# Patient Record
Sex: FEMALE | Race: BLACK OR AFRICAN AMERICAN | NOT HISPANIC OR LATINO | URBAN - METROPOLITAN AREA
[De-identification: names, ages, dates, MRNs, and addresses within clinical notes are randomized per-mention and may not be internally consistent; named-entity substitution may affect disease eponyms.]

---

## 2020-01-07 ENCOUNTER — EMERGENCY (EMERGENCY)
Facility: HOSPITAL | Age: 25
LOS: 0 days | Discharge: HOME | End: 2020-01-07
Attending: EMERGENCY MEDICINE
Payer: COMMERCIAL

## 2020-01-07 VITALS
DIASTOLIC BLOOD PRESSURE: 75 MMHG | SYSTOLIC BLOOD PRESSURE: 112 MMHG | OXYGEN SATURATION: 99 % | RESPIRATION RATE: 16 BRPM | TEMPERATURE: 98 F | HEART RATE: 72 BPM

## 2020-01-07 VITALS
WEIGHT: 110.01 LBS | SYSTOLIC BLOOD PRESSURE: 133 MMHG | DIASTOLIC BLOOD PRESSURE: 84 MMHG | HEIGHT: 64 IN | TEMPERATURE: 98 F | HEART RATE: 97 BPM | RESPIRATION RATE: 20 BRPM | OXYGEN SATURATION: 98 %

## 2020-01-07 DIAGNOSIS — R00.2 PALPITATIONS: ICD-10-CM

## 2020-01-07 DIAGNOSIS — N93.9 ABNORMAL UTERINE AND VAGINAL BLEEDING, UNSPECIFIED: ICD-10-CM

## 2020-01-07 DIAGNOSIS — Z88.1 ALLERGY STATUS TO OTHER ANTIBIOTIC AGENTS STATUS: ICD-10-CM

## 2020-01-07 DIAGNOSIS — Z88.0 ALLERGY STATUS TO PENICILLIN: ICD-10-CM

## 2020-01-07 LAB
ALBUMIN SERPL ELPH-MCNC: 3.4 G/DL — SIGNIFICANT CHANGE UP (ref 3.3–5)
ALP SERPL-CCNC: 46 U/L — SIGNIFICANT CHANGE UP (ref 40–120)
ALT FLD-CCNC: 20 U/L — SIGNIFICANT CHANGE UP (ref 12–78)
ANION GAP SERPL CALC-SCNC: 6 MMOL/L — SIGNIFICANT CHANGE UP (ref 5–17)
APTT BLD: 28.2 SEC — SIGNIFICANT CHANGE UP (ref 27.5–36.3)
AST SERPL-CCNC: 20 U/L — SIGNIFICANT CHANGE UP (ref 15–37)
BASOPHILS # BLD AUTO: 0.04 K/UL — SIGNIFICANT CHANGE UP (ref 0–0.2)
BASOPHILS NFR BLD AUTO: 0.7 % — SIGNIFICANT CHANGE UP (ref 0–2)
BILIRUB SERPL-MCNC: 0.1 MG/DL — LOW (ref 0.2–1.2)
BUN SERPL-MCNC: 8 MG/DL — SIGNIFICANT CHANGE UP (ref 7–23)
CALCIUM SERPL-MCNC: 8.8 MG/DL — SIGNIFICANT CHANGE UP (ref 8.5–10.1)
CHLORIDE SERPL-SCNC: 108 MMOL/L — SIGNIFICANT CHANGE UP (ref 96–108)
CO2 SERPL-SCNC: 27 MMOL/L — SIGNIFICANT CHANGE UP (ref 22–31)
CREAT SERPL-MCNC: 0.74 MG/DL — SIGNIFICANT CHANGE UP (ref 0.5–1.3)
EOSINOPHIL # BLD AUTO: 0.14 K/UL — SIGNIFICANT CHANGE UP (ref 0–0.5)
EOSINOPHIL NFR BLD AUTO: 2.6 % — SIGNIFICANT CHANGE UP (ref 0–6)
GLUCOSE SERPL-MCNC: 90 MG/DL — SIGNIFICANT CHANGE UP (ref 70–99)
HCG SERPL-ACNC: <1 MIU/ML — SIGNIFICANT CHANGE UP
HCT VFR BLD CALC: 37.9 % — SIGNIFICANT CHANGE UP (ref 34.5–45)
HGB BLD-MCNC: 11.5 G/DL — SIGNIFICANT CHANGE UP (ref 11.5–15.5)
IMM GRANULOCYTES NFR BLD AUTO: 0 % — SIGNIFICANT CHANGE UP (ref 0–1.5)
INR BLD: 1.16 RATIO — SIGNIFICANT CHANGE UP (ref 0.88–1.16)
LYMPHOCYTES # BLD AUTO: 2.2 K/UL — SIGNIFICANT CHANGE UP (ref 1–3.3)
LYMPHOCYTES # BLD AUTO: 41.1 % — SIGNIFICANT CHANGE UP (ref 13–44)
MCHC RBC-ENTMCNC: 25 PG — LOW (ref 27–34)
MCHC RBC-ENTMCNC: 30.3 GM/DL — LOW (ref 32–36)
MCV RBC AUTO: 82.4 FL — SIGNIFICANT CHANGE UP (ref 80–100)
MONOCYTES # BLD AUTO: 0.6 K/UL — SIGNIFICANT CHANGE UP (ref 0–0.9)
MONOCYTES NFR BLD AUTO: 11.2 % — SIGNIFICANT CHANGE UP (ref 2–14)
NEUTROPHILS # BLD AUTO: 2.37 K/UL — SIGNIFICANT CHANGE UP (ref 1.8–7.4)
NEUTROPHILS NFR BLD AUTO: 44.4 % — SIGNIFICANT CHANGE UP (ref 43–77)
NRBC # BLD: 0 /100 WBCS — SIGNIFICANT CHANGE UP (ref 0–0)
PLATELET # BLD AUTO: 269 K/UL — SIGNIFICANT CHANGE UP (ref 150–400)
POTASSIUM SERPL-MCNC: 3.8 MMOL/L — SIGNIFICANT CHANGE UP (ref 3.5–5.3)
POTASSIUM SERPL-SCNC: 3.8 MMOL/L — SIGNIFICANT CHANGE UP (ref 3.5–5.3)
PROT SERPL-MCNC: 7 GM/DL — SIGNIFICANT CHANGE UP (ref 6–8.3)
PROTHROM AB SERPL-ACNC: 13 SEC — HIGH (ref 10–12.9)
RBC # BLD: 4.6 M/UL — SIGNIFICANT CHANGE UP (ref 3.8–5.2)
RBC # FLD: 16.5 % — HIGH (ref 10.3–14.5)
SODIUM SERPL-SCNC: 141 MMOL/L — SIGNIFICANT CHANGE UP (ref 135–145)
WBC # BLD: 5.35 K/UL — SIGNIFICANT CHANGE UP (ref 3.8–10.5)
WBC # FLD AUTO: 5.35 K/UL — SIGNIFICANT CHANGE UP (ref 3.8–10.5)

## 2020-01-07 PROCEDURE — 93010 ELECTROCARDIOGRAM REPORT: CPT

## 2020-01-07 PROCEDURE — 99284 EMERGENCY DEPT VISIT MOD MDM: CPT

## 2020-01-07 RX ORDER — ACETAMINOPHEN 500 MG
650 TABLET ORAL ONCE
Refills: 0 | Status: COMPLETED | OUTPATIENT
Start: 2020-01-07 | End: 2020-01-07

## 2020-01-07 RX ADMIN — Medication 650 MILLIGRAM(S): at 19:20

## 2020-01-07 NOTE — ED PROVIDER NOTE - OBJECTIVE STATEMENT
23 yo female presents with palpitations, vaginal bleeding daily since October after receiving Depo-vera injection. Patient has been tried on estrogen for 7 days x2. Patient denies fever, chills, abdominal pain, nausea, vomiting, pregnancy, vaginal discharge.

## 2020-01-07 NOTE — ED PROVIDER NOTE - CLINICAL SUMMARY MEDICAL DECISION MAKING FREE TEXT BOX
Patient with vaginal bleeding since depo injection, results provided, patient to f/u with gyn doctor

## 2020-01-07 NOTE — ED PROVIDER NOTE - CARE PLAN
Principal Discharge DX:	Vaginal bleeding Principal Discharge DX:	Vaginal bleeding  Secondary Diagnosis:	Palpitations

## 2020-01-07 NOTE — ED PROVIDER NOTE - PATIENT PORTAL LINK FT
You can access the FollowMyHealth Patient Portal offered by [facility] by registering at the following website: [email or web address]. By joining [employer]’s FollowMyHealth portal, you will also be able to view your health information using other applications (apps) compatible with our system.

## 2020-01-07 NOTE — ED ADULT TRIAGE NOTE - CHIEF COMPLAINT QUOTE
pt c/o palpitations , on and off for several months , yesterday it started again and I feel sob sometimes, denies medical hx

## 2020-02-05 ENCOUNTER — NON-APPOINTMENT (OUTPATIENT)
Age: 25
End: 2020-02-05

## 2020-02-05 ENCOUNTER — APPOINTMENT (OUTPATIENT)
Dept: INTERNAL MEDICINE | Facility: CLINIC | Age: 25
End: 2020-02-05
Payer: COMMERCIAL

## 2020-02-05 VITALS
WEIGHT: 110 LBS | BODY MASS INDEX: 18.78 KG/M2 | HEART RATE: 90 BPM | TEMPERATURE: 98 F | DIASTOLIC BLOOD PRESSURE: 65 MMHG | HEIGHT: 64 IN | RESPIRATION RATE: 16 BRPM | OXYGEN SATURATION: 99 % | SYSTOLIC BLOOD PRESSURE: 98 MMHG

## 2020-02-05 DIAGNOSIS — R00.2 PALPITATIONS: ICD-10-CM

## 2020-02-05 DIAGNOSIS — M41.9 SCOLIOSIS, UNSPECIFIED: ICD-10-CM

## 2020-02-05 LAB
BILIRUB UR QL STRIP: NEGATIVE
CLARITY UR: CLEAR
COLLECTION METHOD: NORMAL
GLUCOSE UR-MCNC: NEGATIVE
HCG UR QL: 0.2 EU/DL
HGB UR QL STRIP.AUTO: NORMAL
KETONES UR-MCNC: NEGATIVE
LEUKOCYTE ESTERASE UR QL STRIP: NEGATIVE
NITRITE UR QL STRIP: NEGATIVE
PH UR STRIP: 7
PROT UR STRIP-MCNC: NEGATIVE
SP GR UR STRIP: 1.02

## 2020-02-05 PROCEDURE — 81002 URINALYSIS NONAUTO W/O SCOPE: CPT

## 2020-02-05 PROCEDURE — 93000 ELECTROCARDIOGRAM COMPLETE: CPT

## 2020-02-05 PROCEDURE — 36415 COLL VENOUS BLD VENIPUNCTURE: CPT

## 2020-02-05 PROCEDURE — 94010 BREATHING CAPACITY TEST: CPT

## 2020-02-05 PROCEDURE — 99385 PREV VISIT NEW AGE 18-39: CPT | Mod: 25

## 2020-02-05 NOTE — HISTORY OF PRESENT ILLNESS
[FreeTextEntry1] : Palpitaion   recently\par No cp or sob\par Transient\par No assoc symptoms\par Mid chest [de-identified] : Scoliosis\par Asymptomatic\par Never under treatment\par Prior evaluations reportedly done

## 2020-02-05 NOTE — HEALTH RISK ASSESSMENT
[Excellent] : ~his/her~  mood as  excellent [No] : No [0] : 1) Little interest or pleasure doing things: Not at all (0) [None] : None [# of Members in Household ___] :  household currently consist of [unfilled] member(s) [Student] : student [With Family] : lives with family [Sexually Active] : sexually active [Single] : single [Feels Safe at Home] : Feels safe at home [] : No [de-identified] : non structured [de-identified] : "regular" [[id number]] : 0 [High Risk Behavior] : no high risk behavior [FreeTextEntry2] : Cortez Antoine   criminal justice   pre nursing [FreeTextEntry3] : BF  1y

## 2020-02-05 NOTE — PLAN
[FreeTextEntry1] : Call in a week for the blood test reports\par \par \par To see Dr Paulino for update on OCs

## 2020-02-05 NOTE — PHYSICAL EXAM
[No Acute Distress] : no acute distress [Well Developed] : well developed [Well Nourished] : well nourished [Normal Sclera/Conjunctiva] : normal sclera/conjunctiva [Well-Appearing] : well-appearing [EOMI] : extraocular movements intact [PERRL] : pupils equal round and reactive to light [Normal Outer Ear/Nose] : the outer ears and nose were normal in appearance [Normal Oropharynx] : the oropharynx was normal [No JVD] : no jugular venous distention [No Lymphadenopathy] : no lymphadenopathy [Supple] : supple [Thyroid Normal, No Nodules] : the thyroid was normal and there were no nodules present [No Respiratory Distress] : no respiratory distress  [Clear to Auscultation] : lungs were clear to auscultation bilaterally [No Accessory Muscle Use] : no accessory muscle use [Regular Rhythm] : with a regular rhythm [Normal Rate] : normal rate  [Normal S1, S2] : normal S1 and S2 [No Murmur] : no murmur heard [No Carotid Bruits] : no carotid bruits [No Varicosities] : no varicosities [No Abdominal Bruit] : a ~M bruit was not heard ~T in the abdomen [Pedal Pulses Present] : the pedal pulses are present [No Edema] : there was no peripheral edema [No Palpable Aorta] : no palpable aorta [No Extremity Clubbing/Cyanosis] : no extremity clubbing/cyanosis [Soft] : abdomen soft [Non Tender] : non-tender [Non-distended] : non-distended [No Masses] : no abdominal mass palpated [No HSM] : no HSM [Normal Bowel Sounds] : normal bowel sounds [Normal Posterior Cervical Nodes] : no posterior cervical lymphadenopathy [Normal Anterior Cervical Nodes] : no anterior cervical lymphadenopathy [No CVA Tenderness] : no CVA  tenderness [No Spinal Tenderness] : no spinal tenderness [No Joint Swelling] : no joint swelling [Grossly Normal Strength/Tone] : grossly normal strength/tone [No Rash] : no rash [Coordination Grossly Intact] : coordination grossly intact [No Focal Deficits] : no focal deficits [Normal Gait] : normal gait [Deep Tendon Reflexes (DTR)] : deep tendon reflexes were 2+ and symmetric [Normal Insight/Judgement] : insight and judgment were intact [Normal Affect] : the affect was normal

## 2020-02-06 LAB
25(OH)D3 SERPL-MCNC: 24.9 NG/ML
ALBUMIN SERPL ELPH-MCNC: 4.5 G/DL
ALP BLD-CCNC: 43 U/L
ALT SERPL-CCNC: 8 U/L
ANION GAP SERPL CALC-SCNC: 10 MMOL/L
AST SERPL-CCNC: 17 U/L
BASOPHILS # BLD AUTO: 0.05 K/UL
BASOPHILS NFR BLD AUTO: 1 %
BILIRUB SERPL-MCNC: 0.2 MG/DL
BUN SERPL-MCNC: 6 MG/DL
CALCIUM SERPL-MCNC: 9.5 MG/DL
CHLORIDE SERPL-SCNC: 104 MMOL/L
CHOLEST SERPL-MCNC: 151 MG/DL
CHOLEST/HDLC SERPL: 2.8 RATIO
CO2 SERPL-SCNC: 26 MMOL/L
CREAT SERPL-MCNC: 0.83 MG/DL
EOSINOPHIL # BLD AUTO: 0.14 K/UL
EOSINOPHIL NFR BLD AUTO: 2.7 %
ESTIMATED AVERAGE GLUCOSE: 114 MG/DL
FERRITIN SERPL-MCNC: 8 NG/ML
FOLATE SERPL-MCNC: 9.9 NG/ML
GLUCOSE SERPL-MCNC: 90 MG/DL
HBA1C MFR BLD HPLC: 5.6 %
HCT VFR BLD CALC: 35.1 %
HDLC SERPL-MCNC: 55 MG/DL
HGB BLD-MCNC: 10.5 G/DL
IMM GRANULOCYTES NFR BLD AUTO: 0.2 %
IRON SATN MFR SERPL: 7 %
IRON SERPL-MCNC: 29 UG/DL
LDLC SERPL CALC-MCNC: 84 MG/DL
LYMPHOCYTES # BLD AUTO: 2.43 K/UL
LYMPHOCYTES NFR BLD AUTO: 47.6 %
MAN DIFF?: NORMAL
MCHC RBC-ENTMCNC: 24.8 PG
MCHC RBC-ENTMCNC: 29.9 GM/DL
MCV RBC AUTO: 83 FL
MONOCYTES # BLD AUTO: 0.35 K/UL
MONOCYTES NFR BLD AUTO: 6.8 %
NEUTROPHILS # BLD AUTO: 2.13 K/UL
NEUTROPHILS NFR BLD AUTO: 41.7 %
PLATELET # BLD AUTO: 235 K/UL
POTASSIUM SERPL-SCNC: 4.3 MMOL/L
PROT SERPL-MCNC: 7 G/DL
RBC # BLD: 4.23 M/UL
RBC # BLD: 4.23 M/UL
RBC # FLD: 16 %
RETICS # AUTO: 1.2 %
RETICS AGGREG/RBC NFR: 52 K/UL
SODIUM SERPL-SCNC: 140 MMOL/L
T4 FREE SERPL-MCNC: 1.1 NG/DL
TIBC SERPL-MCNC: 413 UG/DL
TRIGL SERPL-MCNC: 62 MG/DL
TSH SERPL-ACNC: 1.06 UIU/ML
UIBC SERPL-MCNC: 385 UG/DL
VIT B12 SERPL-MCNC: 1564 PG/ML
WBC # FLD AUTO: 5.11 K/UL

## 2020-08-03 ENCOUNTER — RESULT REVIEW (OUTPATIENT)
Age: 25
End: 2020-08-03

## 2020-11-24 ENCOUNTER — TRANSCRIPTION ENCOUNTER (OUTPATIENT)
Age: 25
End: 2020-11-24

## 2020-11-24 ENCOUNTER — MED ADMIN CHARGE (OUTPATIENT)
Age: 25
End: 2020-11-24

## 2020-11-24 ENCOUNTER — APPOINTMENT (OUTPATIENT)
Dept: INTERNAL MEDICINE | Facility: CLINIC | Age: 25
End: 2020-11-24
Payer: COMMERCIAL

## 2020-11-24 VITALS
DIASTOLIC BLOOD PRESSURE: 80 MMHG | WEIGHT: 107 LBS | SYSTOLIC BLOOD PRESSURE: 115 MMHG | OXYGEN SATURATION: 99 % | TEMPERATURE: 98.6 F | HEART RATE: 69 BPM

## 2020-11-24 VITALS — DIASTOLIC BLOOD PRESSURE: 70 MMHG | SYSTOLIC BLOOD PRESSURE: 100 MMHG

## 2020-11-24 DIAGNOSIS — Z23 ENCOUNTER FOR IMMUNIZATION: ICD-10-CM

## 2020-11-24 DIAGNOSIS — Z80.0 FAMILY HISTORY OF MALIGNANT NEOPLASM OF DIGESTIVE ORGANS: ICD-10-CM

## 2020-11-24 DIAGNOSIS — Z82.5 FAMILY HISTORY OF ASTHMA AND OTHER CHRONIC LOWER RESPIRATORY DISEASES: ICD-10-CM

## 2020-11-24 DIAGNOSIS — E55.9 VITAMIN D DEFICIENCY, UNSPECIFIED: ICD-10-CM

## 2020-11-24 DIAGNOSIS — N94.6 DYSMENORRHEA, UNSPECIFIED: ICD-10-CM

## 2020-11-24 DIAGNOSIS — Z78.9 OTHER SPECIFIED HEALTH STATUS: ICD-10-CM

## 2020-11-24 PROCEDURE — 90715 TDAP VACCINE 7 YRS/> IM: CPT

## 2020-11-24 PROCEDURE — 90472 IMMUNIZATION ADMIN EACH ADD: CPT

## 2020-11-24 PROCEDURE — 90686 IIV4 VACC NO PRSV 0.5 ML IM: CPT

## 2020-11-24 PROCEDURE — 99385 PREV VISIT NEW AGE 18-39: CPT | Mod: 25

## 2020-11-24 PROCEDURE — G0008: CPT

## 2020-11-24 NOTE — HEALTH RISK ASSESSMENT
[Patient reported PAP Smear was normal] : Patient reported PAP Smear was normal [None] : None [With Family] : lives with family [Single] : single [Fully functional (bathing, dressing, toileting, transferring, walking, feeding)] : Fully functional (bathing, dressing, toileting, transferring, walking, feeding) [Fully functional (using the telephone, shopping, preparing meals, housekeeping, doing laundry, using] : Fully functional and needs no help or supervision to perform IADLs (using the telephone, shopping, preparing meals, housekeeping, doing laundry, using transportation, managing medications and managing finances) [Good] : ~his/her~  mood as  good [Yes] : Yes [] : No [de-identified] : no exercise [Change in mental status noted] : No change in mental status noted [Reports changes in hearing] : Reports no changes in hearing [Reports changes in vision] : Reports no changes in vision [PapSmearDate] : 2020

## 2020-11-25 LAB
ALBUMIN SERPL ELPH-MCNC: 4.4 G/DL
ALP BLD-CCNC: 38 U/L
ALT SERPL-CCNC: 11 U/L
ANION GAP SERPL CALC-SCNC: 10 MMOL/L
AST SERPL-CCNC: 20 U/L
BASOPHILS # BLD AUTO: 0.06 K/UL
BASOPHILS NFR BLD AUTO: 1.1 %
BILIRUB SERPL-MCNC: <0.2 MG/DL
BUN SERPL-MCNC: 6 MG/DL
CALCIUM SERPL-MCNC: 9.7 MG/DL
CHLORIDE SERPL-SCNC: 104 MMOL/L
CHOLEST SERPL-MCNC: 163 MG/DL
CO2 SERPL-SCNC: 24 MMOL/L
CREAT SERPL-MCNC: 0.77 MG/DL
EOSINOPHIL # BLD AUTO: 0.18 K/UL
EOSINOPHIL NFR BLD AUTO: 3.2 %
GLUCOSE SERPL-MCNC: 69 MG/DL
HBV SURFACE AB SERPL IA-ACNC: 70.5 MIU/ML
HBV SURFACE AG SER QL: NONREACTIVE
HCT VFR BLD CALC: 40.2 %
HCV AB SER QL: NONREACTIVE
HCV S/CO RATIO: 0.17 S/CO
HDLC SERPL-MCNC: 55 MG/DL
HGB BLD-MCNC: 12 G/DL
IMM GRANULOCYTES NFR BLD AUTO: 0 %
LDLC SERPL CALC-MCNC: 86 MG/DL
LYMPHOCYTES # BLD AUTO: 2.42 K/UL
LYMPHOCYTES NFR BLD AUTO: 42.9 %
MAN DIFF?: NORMAL
MCHC RBC-ENTMCNC: 24.7 PG
MCHC RBC-ENTMCNC: 29.9 GM/DL
MCV RBC AUTO: 82.7 FL
MEV IGG FLD QL IA: 264 AU/ML
MEV IGG+IGM SER-IMP: POSITIVE
MONOCYTES # BLD AUTO: 0.52 K/UL
MONOCYTES NFR BLD AUTO: 9.2 %
MUV AB SER-ACNC: POSITIVE
MUV IGG SER QL IA: 65.1 AU/ML
NEUTROPHILS # BLD AUTO: 2.46 K/UL
NEUTROPHILS NFR BLD AUTO: 43.6 %
NONHDLC SERPL-MCNC: 108 MG/DL
PLATELET # BLD AUTO: 258 K/UL
POTASSIUM SERPL-SCNC: 4.3 MMOL/L
PROT SERPL-MCNC: 7.7 G/DL
RBC # BLD: 4.86 M/UL
RBC # FLD: 19.7 %
RUBV IGG FLD-ACNC: 2.2 INDEX
RUBV IGG SER-IMP: POSITIVE
SODIUM SERPL-SCNC: 138 MMOL/L
TRIGL SERPL-MCNC: 111 MG/DL
VZV AB TITR SER: POSITIVE
VZV IGG SER IF-ACNC: 283.9 INDEX
WBC # FLD AUTO: 5.64 K/UL

## 2020-11-27 LAB — 24R-OH-CALCIDIOL SERPL-MCNC: 70.4 PG/ML

## 2020-12-01 ENCOUNTER — APPOINTMENT (OUTPATIENT)
Dept: INTERNAL MEDICINE | Facility: CLINIC | Age: 25
End: 2020-12-01
Payer: COMMERCIAL

## 2020-12-01 PROCEDURE — 99072 ADDL SUPL MATRL&STAF TM PHE: CPT

## 2020-12-01 PROCEDURE — 86580 TB INTRADERMAL TEST: CPT

## 2021-02-27 ENCOUNTER — EMERGENCY (EMERGENCY)
Facility: HOSPITAL | Age: 26
LOS: 1 days | Discharge: HOME | End: 2021-02-27
Admitting: EMERGENCY MEDICINE
Payer: COMMERCIAL

## 2021-02-27 VITALS
DIASTOLIC BLOOD PRESSURE: 70 MMHG | RESPIRATION RATE: 16 BRPM | SYSTOLIC BLOOD PRESSURE: 125 MMHG | OXYGEN SATURATION: 100 % | HEART RATE: 68 BPM | TEMPERATURE: 98 F

## 2021-02-27 VITALS
SYSTOLIC BLOOD PRESSURE: 132 MMHG | OXYGEN SATURATION: 100 % | HEART RATE: 84 BPM | RESPIRATION RATE: 16 BRPM | DIASTOLIC BLOOD PRESSURE: 76 MMHG | TEMPERATURE: 98 F | HEIGHT: 64 IN

## 2021-02-27 LAB
ALBUMIN SERPL ELPH-MCNC: 4.1 G/DL — SIGNIFICANT CHANGE UP (ref 3.3–5)
ALP SERPL-CCNC: 42 U/L — SIGNIFICANT CHANGE UP (ref 40–120)
ALT FLD-CCNC: 16 U/L — SIGNIFICANT CHANGE UP (ref 4–33)
ANION GAP SERPL CALC-SCNC: 8 MMOL/L — SIGNIFICANT CHANGE UP (ref 7–14)
AST SERPL-CCNC: 22 U/L — SIGNIFICANT CHANGE UP (ref 4–32)
BASOPHILS # BLD AUTO: 0.05 K/UL — SIGNIFICANT CHANGE UP (ref 0–0.2)
BASOPHILS NFR BLD AUTO: 0.8 % — SIGNIFICANT CHANGE UP (ref 0–2)
BILIRUB SERPL-MCNC: <0.2 MG/DL — SIGNIFICANT CHANGE UP (ref 0.2–1.2)
BUN SERPL-MCNC: 7 MG/DL — SIGNIFICANT CHANGE UP (ref 7–23)
CALCIUM SERPL-MCNC: 9.5 MG/DL — SIGNIFICANT CHANGE UP (ref 8.4–10.5)
CHLORIDE SERPL-SCNC: 102 MMOL/L — SIGNIFICANT CHANGE UP (ref 98–107)
CO2 SERPL-SCNC: 29 MMOL/L — SIGNIFICANT CHANGE UP (ref 22–31)
CREAT SERPL-MCNC: 0.98 MG/DL — SIGNIFICANT CHANGE UP (ref 0.5–1.3)
EOSINOPHIL # BLD AUTO: 0.3 K/UL — SIGNIFICANT CHANGE UP (ref 0–0.5)
EOSINOPHIL NFR BLD AUTO: 4.8 % — SIGNIFICANT CHANGE UP (ref 0–6)
GLUCOSE SERPL-MCNC: 81 MG/DL — SIGNIFICANT CHANGE UP (ref 70–99)
HCT VFR BLD CALC: 40.1 % — SIGNIFICANT CHANGE UP (ref 34.5–45)
HGB BLD-MCNC: 12.6 G/DL — SIGNIFICANT CHANGE UP (ref 11.5–15.5)
IANC: 2.11 K/UL — SIGNIFICANT CHANGE UP (ref 1.5–8.5)
IMM GRANULOCYTES NFR BLD AUTO: 0.2 % — SIGNIFICANT CHANGE UP (ref 0–1.5)
LYMPHOCYTES # BLD AUTO: 3.22 K/UL — SIGNIFICANT CHANGE UP (ref 1–3.3)
LYMPHOCYTES # BLD AUTO: 51.8 % — HIGH (ref 13–44)
MCHC RBC-ENTMCNC: 27.1 PG — SIGNIFICANT CHANGE UP (ref 27–34)
MCHC RBC-ENTMCNC: 31.4 GM/DL — LOW (ref 32–36)
MCV RBC AUTO: 86.2 FL — SIGNIFICANT CHANGE UP (ref 80–100)
MONOCYTES # BLD AUTO: 0.53 K/UL — SIGNIFICANT CHANGE UP (ref 0–0.9)
MONOCYTES NFR BLD AUTO: 8.5 % — SIGNIFICANT CHANGE UP (ref 2–14)
NEUTROPHILS # BLD AUTO: 2.11 K/UL — SIGNIFICANT CHANGE UP (ref 1.8–7.4)
NEUTROPHILS NFR BLD AUTO: 33.9 % — LOW (ref 43–77)
NRBC # BLD: 0 /100 WBCS — SIGNIFICANT CHANGE UP
NRBC # FLD: 0 K/UL — SIGNIFICANT CHANGE UP
PLATELET # BLD AUTO: 279 K/UL — SIGNIFICANT CHANGE UP (ref 150–400)
POTASSIUM SERPL-MCNC: 3.9 MMOL/L — SIGNIFICANT CHANGE UP (ref 3.5–5.3)
POTASSIUM SERPL-SCNC: 3.9 MMOL/L — SIGNIFICANT CHANGE UP (ref 3.5–5.3)
PROT SERPL-MCNC: 7.1 G/DL — SIGNIFICANT CHANGE UP (ref 6–8.3)
RBC # BLD: 4.65 M/UL — SIGNIFICANT CHANGE UP (ref 3.8–5.2)
RBC # FLD: 16.3 % — HIGH (ref 10.3–14.5)
SODIUM SERPL-SCNC: 139 MMOL/L — SIGNIFICANT CHANGE UP (ref 135–145)
WBC # BLD: 6.22 K/UL — SIGNIFICANT CHANGE UP (ref 3.8–10.5)
WBC # FLD AUTO: 6.22 K/UL — SIGNIFICANT CHANGE UP (ref 3.8–10.5)

## 2021-02-27 PROCEDURE — 70450 CT HEAD/BRAIN W/O DYE: CPT | Mod: 26

## 2021-02-27 PROCEDURE — 99285 EMERGENCY DEPT VISIT HI MDM: CPT

## 2021-02-27 RX ORDER — SODIUM CHLORIDE 9 MG/ML
1000 INJECTION INTRAMUSCULAR; INTRAVENOUS; SUBCUTANEOUS ONCE
Refills: 0 | Status: COMPLETED | OUTPATIENT
Start: 2021-02-27 | End: 2021-02-27

## 2021-02-27 RX ORDER — MAGNESIUM SULFATE 500 MG/ML
1 VIAL (ML) INJECTION ONCE
Refills: 0 | Status: COMPLETED | OUTPATIENT
Start: 2021-02-27 | End: 2021-02-27

## 2021-02-27 RX ORDER — METOCLOPRAMIDE HCL 10 MG
10 TABLET ORAL ONCE
Refills: 0 | Status: COMPLETED | OUTPATIENT
Start: 2021-02-27 | End: 2021-02-27

## 2021-02-27 RX ADMIN — Medication 10 MILLIGRAM(S): at 21:10

## 2021-02-27 RX ADMIN — SODIUM CHLORIDE 1000 MILLILITER(S): 9 INJECTION INTRAMUSCULAR; INTRAVENOUS; SUBCUTANEOUS at 20:53

## 2021-02-27 RX ADMIN — Medication 100 GRAM(S): at 21:48

## 2021-02-27 NOTE — ED PROVIDER NOTE - PATIENT PORTAL LINK FT
You can access the FollowMyHealth Patient Portal offered by [facility] by registering at the following website: [email or web address]. By joining [employer]’s FollowMyHealth portal, you will also be able to view your health information using other applications (apps) compatible with our system.

## 2021-02-27 NOTE — ED PROVIDER NOTE - CLINICAL SUMMARY MEDICAL DECISION MAKING FREE TEXT BOX
26 Y/O F PMH Anemia denies PSH states that she has had a L sided HA for the past week. Pt states she has been taking Naproxen for her symptoms and previously tried Motrin and Tylenol without relief. Pt states she has never had neuroimaging prior. Plan is headache cocktail, CT to R/O acute intracranial abnormality. 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

## 2021-02-27 NOTE — ED PROVIDER NOTE - OBJECTIVE STATEMENT
24 Y/O F PMH Anemia denies PSH states that she has had a L sided HA for the past week. Pt states she has been taking Naproxen for her symptoms and previously tried Motrin and Tylenol without relief. 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 Pt states the pain is currently 8/10. Pt denies trauma or specific injury. Pt denies any other sx or acute complaints. 24 Y/O F PMH Anemia denies PSH states that she has had a L sided HA for the past week. Pt states she has been taking Naproxen for her symptoms and previously tried Motrin and Tylenol without relief. 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 Pt states the pain is currently 8/10. Pt denies trauma or specific injury. Pt states she does not normally suffer from headaches, states she has not had neuroimaging previously. Pt denies any other sx or acute complaints.

## 2021-02-27 NOTE — ED ADULT NURSE NOTE - NSSEPSISSUSPECTED_ED_A_ED
Quality 130: Documentation Of Current Medications In The Medical Record: Current Medications Documented Detail Level: Detailed Quality 226: Preventive Care And Screening: Tobacco Use: Screening And Cessation Intervention: Patient screened for tobacco use and is an ex/non-smoker Quality 431: Preventive Care And Screening: Unhealthy Alcohol Use - Screening: Patient screened for unhealthy alcohol use using a single question and scores less than 2 times per year No

## 2021-02-27 NOTE — ED ADULT NURSE NOTE - OBJECTIVE STATEMENT
25 year old female received to intake rm 10B A&Ox4, ambulatory. pt c/o intermittent left sided headache with pain behind left eye x1 wk. Pt rates pain 8/10 at this time. Pt states she took Motrin and Naproxen at home with no relief. Pt states "I don't know if it's because I am under stress with studying". Pt endorses missing menstrual cycle x 2months, taking birth control pills. Pt has menstrual period now x13 days. respirations even and unlabored sating 100% room air. Skin intact. Pt denies dizziness, changes in vision, chest pain, shortness of breath, n/v/d. PIV #20 left AC, labs drawn and sent. Medicated per md orders. Bed in lowest position, safety maintained.

## 2021-02-27 NOTE — ED ADULT TRIAGE NOTE - CHIEF COMPLAINT QUOTE
Pt c/o recurrent headache for a week, mostly on the left side of her head, with pressure behind the eye. Denies dizziness, denies N/V, denies head injury.

## 2021-02-27 NOTE — ED PROVIDER NOTE - NSFOLLOWUPINSTRUCTIONS_ED_ALL_ED_FT
Follow up with your primary doctor and a Neurologist. The ER will call you to set up an appointment, if they do not call  to schedule an appointment. Take Motrin and Tylenol as needed and take Mag Ox every 8 hours as needed for headache, the medication can cause diarrhea. Advance activity as tolerated.  Continue all previously prescribed medications as directed.  Follow up with your primary care physician in 48-72 hours- bring copies of your results.  Return to the ER for worsening or persistent symptoms, and/or ANY NEW OR CONCERNING SYMPTOMS. If you have issues obtaining follow up, please call: [phone] to obtain a doctor or specialist who takes your insurance in your area.  You may call [phone] to make an appointment with the internal medicine clinic.

## 2021-02-27 NOTE — ED PROVIDER NOTE - PROGRESS NOTE DETAILS
MANOLO Monsalve: Pt reassessed, HA is currently 5/10, previously 9/10, will D/C with outpatient Neurology F/U.

## 2021-02-28 RX ORDER — MAGNESIUM OXIDE 400 MG ORAL TABLET 241.3 MG
1 TABLET ORAL
Qty: 30 | Refills: 0
Start: 2021-02-28 | End: 2021-03-09

## 2021-08-31 ENCOUNTER — RESULT REVIEW (OUTPATIENT)
Age: 26
End: 2021-08-31

## 2021-09-14 ENCOUNTER — EMERGENCY (EMERGENCY)
Facility: HOSPITAL | Age: 26
LOS: 1 days | Discharge: HOME | End: 2021-09-14
Attending: STUDENT IN AN ORGANIZED HEALTH CARE EDUCATION/TRAINING PROGRAM | Admitting: STUDENT IN AN ORGANIZED HEALTH CARE EDUCATION/TRAINING PROGRAM
Payer: COMMERCIAL

## 2021-09-14 VITALS
HEIGHT: 64 IN | OXYGEN SATURATION: 100 % | TEMPERATURE: 98 F | RESPIRATION RATE: 18 BRPM | HEART RATE: 98 BPM | DIASTOLIC BLOOD PRESSURE: 99 MMHG | SYSTOLIC BLOOD PRESSURE: 144 MMHG

## 2021-09-14 LAB
ALBUMIN SERPL ELPH-MCNC: 4.1 G/DL — SIGNIFICANT CHANGE UP (ref 3.3–5)
ALP SERPL-CCNC: 34 U/L — LOW (ref 40–120)
ALT FLD-CCNC: 12 U/L — SIGNIFICANT CHANGE UP (ref 4–33)
ANION GAP SERPL CALC-SCNC: 9 MMOL/L — SIGNIFICANT CHANGE UP (ref 7–14)
AST SERPL-CCNC: 20 U/L — SIGNIFICANT CHANGE UP (ref 4–32)
BILIRUB SERPL-MCNC: <0.2 MG/DL — SIGNIFICANT CHANGE UP (ref 0.2–1.2)
BUN SERPL-MCNC: 7 MG/DL — SIGNIFICANT CHANGE UP (ref 7–23)
CALCIUM SERPL-MCNC: 9.2 MG/DL — SIGNIFICANT CHANGE UP (ref 8.4–10.5)
CHLORIDE SERPL-SCNC: 106 MMOL/L — SIGNIFICANT CHANGE UP (ref 98–107)
CO2 SERPL-SCNC: 25 MMOL/L — SIGNIFICANT CHANGE UP (ref 22–31)
CREAT SERPL-MCNC: 0.8 MG/DL — SIGNIFICANT CHANGE UP (ref 0.5–1.3)
GLUCOSE SERPL-MCNC: 76 MG/DL — SIGNIFICANT CHANGE UP (ref 70–99)
HCT VFR BLD CALC: 39.3 % — SIGNIFICANT CHANGE UP (ref 34.5–45)
HGB BLD-MCNC: 13.2 G/DL — SIGNIFICANT CHANGE UP (ref 11.5–15.5)
LIDOCAIN IGE QN: 33 U/L — SIGNIFICANT CHANGE UP (ref 7–60)
MCHC RBC-ENTMCNC: 29.7 PG — SIGNIFICANT CHANGE UP (ref 27–34)
MCHC RBC-ENTMCNC: 33.6 GM/DL — SIGNIFICANT CHANGE UP (ref 32–36)
MCV RBC AUTO: 88.5 FL — SIGNIFICANT CHANGE UP (ref 80–100)
NRBC # BLD: 0 /100 WBCS — SIGNIFICANT CHANGE UP
NRBC # FLD: 0 K/UL — SIGNIFICANT CHANGE UP
PLATELET # BLD AUTO: 228 K/UL — SIGNIFICANT CHANGE UP (ref 150–400)
POTASSIUM SERPL-MCNC: 4.1 MMOL/L — SIGNIFICANT CHANGE UP (ref 3.5–5.3)
POTASSIUM SERPL-SCNC: 4.1 MMOL/L — SIGNIFICANT CHANGE UP (ref 3.5–5.3)
PROT SERPL-MCNC: 7.2 G/DL — SIGNIFICANT CHANGE UP (ref 6–8.3)
RBC # BLD: 4.44 M/UL — SIGNIFICANT CHANGE UP (ref 3.8–5.2)
RBC # FLD: 15.4 % — HIGH (ref 10.3–14.5)
SODIUM SERPL-SCNC: 140 MMOL/L — SIGNIFICANT CHANGE UP (ref 135–145)
WBC # BLD: 6.41 K/UL — SIGNIFICANT CHANGE UP (ref 3.8–10.5)
WBC # FLD AUTO: 6.41 K/UL — SIGNIFICANT CHANGE UP (ref 3.8–10.5)

## 2021-09-14 PROCEDURE — 99284 EMERGENCY DEPT VISIT MOD MDM: CPT

## 2021-09-14 RX ORDER — ONDANSETRON 8 MG/1
4 TABLET, FILM COATED ORAL ONCE
Refills: 0 | Status: COMPLETED | OUTPATIENT
Start: 2021-09-14 | End: 2021-09-14

## 2021-09-14 RX ORDER — KETOROLAC TROMETHAMINE 30 MG/ML
15 SYRINGE (ML) INJECTION ONCE
Refills: 0 | Status: DISCONTINUED | OUTPATIENT
Start: 2021-09-14 | End: 2021-09-14

## 2021-09-14 RX ADMIN — Medication 15 MILLIGRAM(S): at 19:35

## 2021-09-14 RX ADMIN — Medication 30 MILLILITER(S): at 17:34

## 2021-09-14 RX ADMIN — ONDANSETRON 4 MILLIGRAM(S): 8 TABLET, FILM COATED ORAL at 17:33

## 2021-09-14 NOTE — ED PROVIDER NOTE - CLINICAL SUMMARY MEDICAL DECISION MAKING FREE TEXT BOX
[age] yo F with PMH of cervical polyp presenting for retroperitoneal abdominal pain that lasts for 1 hr, worsens with sleeping, eating and drinking. This is most concerning for GERD, pancreatitis, or low back pain. will order CBC, cmp, lipase, meds.

## 2021-09-14 NOTE — ED PROVIDER NOTE - PHYSICAL EXAMINATION
General: WN/WD NAD  Head: Atraumatic, normocephalic  Eyes: EOM grossly in tact, no scleral icterus  ENT: moist mucous membranes  Neurology: A&Ox3, nonfocal, KING x 4  Respiratory: CTAB, no wheezing, normal respiratory effort  CV: RRR, good s1/s2, no S3, Extremities warm and well perfused  Abdominal: Soft, non-distended, non-tender, no masses  Extremities: No edema, no deformities  Skin: warm and dry. No rashes  spine: no lumbar or thoracic tenderness along spinous processes  Psych: appropriate mood and congruent affect

## 2021-09-14 NOTE — ED PROVIDER NOTE - PROGRESS NOTE DETAILS
Tomasa Alexis, PGY-1  Emergency Medicine  pt notes her nausea is better after medications, still experiencing some pain.

## 2021-09-14 NOTE — ED PROVIDER NOTE - NSFOLLOWUPINSTRUCTIONS_ED_ALL_ED_FT
Acute Abdominal Pain    WHAT YOU NEED TO KNOW:  The cause of your abdominal pain may not be found. If a cause is found, treatment will depend on what the cause is.    DISCHARGE INSTRUCTIONS:    Seek care immediately if:  You vomit blood or cannot stop vomiting.  You have blood in your bowel movement or it looks like tar.  You have bleeding from your rectum.  Your abdomen is larger than usual, more painful, and hard.  You have severe pain in your abdomen.  You stop passing gas and having bowel movements.  You feel weak, dizzy, or faint.  Contact your healthcare provider if:  You have a fever.  You have new signs and symptoms.  Your symptoms do not get better with treatment.  You have questions or concerns about your condition or care.  Medicines may be given to decrease pain, treat an infection, and manage your symptoms. Take your medicine as directed. Call your healthcare provider if you think your medicine is not helping or if you have side effects. Tell him if you are allergic to any medicine. Keep a list of the medicines, vitamins, and herbs you take. Include the amounts, and when and why you take them. Bring the list or the pill bottles to follow-up visits. Carry your medicine list with you in case of an emergency.    Manage your symptoms:    Apply heat on your abdomen for 20 to 30 minutes every 2 hours for as many days as directed. Heat helps decrease pain and muscle spasms.  Manage your stress. Stress may cause abdominal pain. Your healthcare provider may recommend relaxation techniques and deep breathing exercises to help decrease your stress. Your healthcare provider may recommend you talk to someone about your stress or anxiety, such as a counselor or a trusted friend. Get plenty of sleep and exercise regularly.  Limit or do not drink alcohol. Alcohol can make your abdominal pain worse. Ask your healthcare provider if it is safe for you to drink alcohol. Also ask how much is safe for you to drink.  Do not smoke. Nicotine and other chemicals in cigarettes can damage your esophagus and stomach. Ask your healthcare provider for information if you currently smoke and need help to quit. E-cigarettes or smokeless tobacco still contain nicotine. Talk to your healthcare provider before you use these products.  Make changes to the food you eat as directed: Do not eat foods that cause abdominal pain or other symptoms. Eat small meals more often.  Eat more high-fiber foods if you are constipated. High-fiber foods include fruits, vegetables, whole-grain foods, and legumes.  Do not eat foods that cause gas if you have bloating. Examples include broccoli, cabbage, and cauliflower. Do not drink soda or carbonated drinks, because these may also cause gas.  Do not eat foods or drinks that contain sorbitol or fructose if you have diarrhea and bloating. Some examples are fruit juices, candy, jelly, and sugar-free gum.  Do not eat high-fat foods, such as fried foods, cheeseburgers, hot dogs, and desserts.  Limit or do not drink caffeine. Caffeine may make symptoms, such as heart burn or nausea, worse.  Drink plenty of liquids to prevent dehydration from diarrhea or vomiting. Ask your healthcare provider how much liquid to drink each day and which liquids are best for you.  Follow up with your healthcare provider as directed: Write down your questions so you remember to ask them during your visits.

## 2021-09-14 NOTE — ED PROVIDER NOTE - OBJECTIVE STATEMENT
Ms. Ramirez is a 26 yo woman with PMH of a cervical polyp comes in complaining of Ms. Ramirez is a 26 yo woman with PMH of a cervical polyp comes in complaining of deep midline back/abdominal pain that started Sunday. The pain is intermittent and lasts for 1 hr and then will stop for 1 hr. The pain worsens with lying down, eating and drinking. She has associated nausea but denies vomiting, diarrhea, dysuria. Her last LMP was on 8/20, she is on BC and denies having any new sexual partners. She has cramps associated with menstruation but this feels different than those.

## 2021-09-14 NOTE — ED PROVIDER NOTE - PATIENT PORTAL LINK FT
You can access the FollowMyHealth Patient Portal offered by [facility] by registering at the following website: [email or web address]. By joining [employer]’s FollowMyHealth portal, you will also be able to view your health information using other applications (apps) compatible with our system.

## 2021-09-14 NOTE — ED PROVIDER NOTE - ATTENDING CONTRIBUTION TO CARE
24 yo female presents to ED for evaluation of abd pain. PE: unremarkable. A/P Labs, medicate, reassess

## 2021-09-15 ENCOUNTER — APPOINTMENT (OUTPATIENT)
Dept: GASTROENTEROLOGY | Facility: CLINIC | Age: 26
End: 2021-09-15
Payer: COMMERCIAL

## 2021-09-15 VITALS
HEIGHT: 64 IN | HEART RATE: 71 BPM | DIASTOLIC BLOOD PRESSURE: 60 MMHG | OXYGEN SATURATION: 99 % | WEIGHT: 106 LBS | TEMPERATURE: 97.5 F | BODY MASS INDEX: 18.1 KG/M2 | SYSTOLIC BLOOD PRESSURE: 104 MMHG

## 2021-09-15 DIAGNOSIS — Z83.438 FAMILY HISTORY OF OTHER DISORDER OF LIPOPROTEIN METABOLISM AND OTHER LIPIDEMIA: ICD-10-CM

## 2021-09-15 DIAGNOSIS — Z87.39 PERSONAL HISTORY OF OTHER DISEASES OF THE MUSCULOSKELETAL SYSTEM AND CONNECTIVE TISSUE: ICD-10-CM

## 2021-09-15 DIAGNOSIS — Z82.49 FAMILY HISTORY OF ISCHEMIC HEART DISEASE AND OTHER DISEASES OF THE CIRCULATORY SYSTEM: ICD-10-CM

## 2021-09-15 DIAGNOSIS — K20.90 ESOPHAGITIS, UNSPECIFIED WITHOUT BLEEDING: ICD-10-CM

## 2021-09-15 PROCEDURE — 99203 OFFICE O/P NEW LOW 30 MIN: CPT

## 2021-09-15 RX ORDER — OMEPRAZOLE 40 MG/1
40 CAPSULE, DELAYED RELEASE ORAL
Qty: 30 | Refills: 2 | Status: ACTIVE | COMMUNITY
Start: 2021-09-15 | End: 1900-01-01

## 2021-09-15 RX ORDER — NORETHINDRONE ACETATE AND ETHINYL ESTRADIOL, AND FERROUS FUMARATE 1MG-20(24)
1-20 KIT ORAL
Refills: 0 | Status: ACTIVE | COMMUNITY

## 2021-09-15 NOTE — ASSESSMENT
[FreeTextEntry1] : Intrascapular pain with nausea and use of nonsteroidal anti-inflammatory drug with mild tenderness in epigastric area suggest most likely symptoms are related to the reflux disease possible esophagitis and gastritis.  Patient is started on omeprazole 40 mg a day which she will continue for 2-month and then stop it.  If symptoms does not subside in 10 days or recur after she stops omeprazole then she will need an upper endoscopy to check other causes like esophageal ulcer or peptic ulcer disease.  H. pylori infection.  If symptoms subside completely and does not recur after 2-month then no further work-up will be needed.

## 2021-09-15 NOTE — PHYSICAL EXAM
[General Appearance - Alert] : alert [General Appearance - In No Acute Distress] : in no acute distress [FreeTextEntry1] : Thin built, mild scoliosis. [Sclera] : the sclera and conjunctiva were normal [PERRL With Normal Accommodation] : pupils were equal in size, round, and reactive to light [Extraocular Movements] : extraocular movements were intact [Outer Ear] : the ears and nose were normal in appearance [Oropharynx] : the oropharynx was normal [Neck Appearance] : the appearance of the neck was normal [Neck Cervical Mass (___cm)] : no neck mass was observed [Jugular Venous Distention Increased] : there was no jugular-venous distention [Thyroid Diffuse Enlargement] : the thyroid was not enlarged [Thyroid Nodule] : there were no palpable thyroid nodules [Heart Rate And Rhythm] : heart rate was normal and rhythm regular [Heart Sounds] : normal S1 and S2 [Heart Sounds Gallop] : no gallops [Murmurs] : no murmurs [Heart Sounds Pericardial Friction Rub] : no pericardial rub [Full Pulse] : the pedal pulses are present [Edema] : there was no peripheral edema [Bowel Sounds] : normal bowel sounds [Abdomen Soft] : soft [Abdomen Tenderness] : non-tender [Abdomen Mass (___ Cm)] : no abdominal mass palpated [Urinary Bladder Findings] : the bladder was normal on palpation [Cervical Lymph Nodes Enlarged Posterior Bilaterally] : posterior cervical [Cervical Lymph Nodes Enlarged Anterior Bilaterally] : anterior cervical [Supraclavicular Lymph Nodes Enlarged Bilaterally] : supraclavicular [Femoral Lymph Nodes Enlarged Bilaterally] : femoral [Axillary Lymph Nodes Enlarged Bilaterally] : axillary [Inguinal Lymph Nodes Enlarged Bilaterally] : inguinal [No CVA Tenderness] : no ~M costovertebral angle tenderness [No Spinal Tenderness] : no spinal tenderness [Nail Clubbing] : no clubbing  or cyanosis of the fingernails [Abnormal Walk] : normal gait [Musculoskeletal - Swelling] : no joint swelling seen [Motor Tone] : muscle strength and tone were normal [Skin Color & Pigmentation] : normal skin color and pigmentation [Skin Turgor] : normal skin turgor [] : no rash [Deep Tendon Reflexes (DTR)] : deep tendon reflexes were 2+ and symmetric [Sensation] : the sensory exam was normal to light touch and pinprick [No Focal Deficits] : no focal deficits [Oriented To Time, Place, And Person] : oriented to person, place, and time [Impaired Insight] : insight and judgment were intact [Affect] : the affect was normal

## 2021-09-15 NOTE — HISTORY OF PRESENT ILLNESS
[Heartburn] : denies heartburn [Vomiting] : denies vomiting [Diarrhea] : denies diarrhea [Constipation] : stable constipation [Yellow Skin Or Eyes (Jaundice)] : denies jaundice [Abdominal Pain] : denies abdominal pain [Abdominal Swelling] : denies abdominal swelling [Rectal Pain] : denies rectal pain [Nausea] : nausea [Wt Gain ___ Lbs] : no recent weight gain [Wt Loss ___ Lbs] : no recent weight loss [GERD] : no gastroesophageal reflux disease [Hiatus Hernia] : no hiatus hernia [Peptic Ulcer Disease] : no peptic ulcer disease [Pancreatitis] : no pancreatitis [Cholelithiasis] : no cholelithiasis [Kidney Stone] : no kidney stone [Inflammatory Bowel Disease] : no inflammatory bowel disease [Irritable Bowel Syndrome] : no irritable bowel syndrome [Diverticulitis] : no diverticulitis [Alcohol Abuse] : no alcohol abuse [Malignancy] : no malignancy [Abdominal Surgery] : no abdominal surgery [Appendectomy] : no appendectomy [Cholecystectomy] : no cholecystectomy [de-identified] : About 5 days ago she developed pain in the interscapular area it was severe associated with nausea.  She also felt discomfort in epigastric area.  Went to the emergency room at [facility] where blood work-up was done which was normal patient was discharged home to be followed by a gastroenterologist.  She has similar symptoms off and on in the past.  Patient takes naproxen off-and-on for headache or menstrual cramps and the last dose taken was few days ago.  She denies any heartburn or dysphagia or melena.\par \par History of chronic constipation bowel movement about 3 times a week.  It has been like this all her life without any recent change.  She is not symptomatic from it.  She denies any right upper quadrant abdominal pain or lower abdominal pain.  No history of jaundice fever or chills. [FreeTextEntry1] : Patient denies any history of hypertension, diabetes mellitus, MI, angina, CHF, TIA, CVA.  She does give history of palpitation at one time went to the ER and she was told it was all related to the anxiety.  Mother thinks she had panic disorder than.  The history of headache off-and-on and menstrual cramps.  Patient CBC and CMP in November 2020 were normal hepatitis B and C antibodies were negative.  Thyroid functions were normal.  EKG in February 2020 showed normal sinus rhythm at a rate of 76/min otherwise normal EKG.

## 2021-11-15 ENCOUNTER — APPOINTMENT (OUTPATIENT)
Dept: FAMILY MEDICINE | Facility: CLINIC | Age: 26
End: 2021-11-15
Payer: COMMERCIAL

## 2021-11-15 VITALS
SYSTOLIC BLOOD PRESSURE: 120 MMHG | HEIGHT: 64 IN | BODY MASS INDEX: 18.1 KG/M2 | TEMPERATURE: 98 F | WEIGHT: 106 LBS | OXYGEN SATURATION: 98 % | DIASTOLIC BLOOD PRESSURE: 74 MMHG | HEART RATE: 72 BPM

## 2021-11-15 DIAGNOSIS — Z23 ENCOUNTER FOR IMMUNIZATION: ICD-10-CM

## 2021-11-15 DIAGNOSIS — Z00.00 ENCOUNTER FOR GENERAL ADULT MEDICAL EXAMINATION W/OUT ABNORMAL FINDINGS: ICD-10-CM

## 2021-11-15 LAB
25(OH)D3 SERPL-MCNC: 31.7 NG/ML
ALBUMIN SERPL ELPH-MCNC: 4.4 G/DL
ALP BLD-CCNC: 36 U/L
ALT SERPL-CCNC: 13 U/L
ANION GAP SERPL CALC-SCNC: 13 MMOL/L
AST SERPL-CCNC: 20 U/L
BASOPHILS # BLD AUTO: 0.05 K/UL
BASOPHILS NFR BLD AUTO: 1.1 %
BILIRUB SERPL-MCNC: 0.3 MG/DL
BUN SERPL-MCNC: 10 MG/DL
CALCIUM SERPL-MCNC: 9.8 MG/DL
CHLORIDE SERPL-SCNC: 102 MMOL/L
CHOLEST SERPL-MCNC: 172 MG/DL
CO2 SERPL-SCNC: 24 MMOL/L
CREAT SERPL-MCNC: 0.9 MG/DL
EOSINOPHIL # BLD AUTO: 0.08 K/UL
EOSINOPHIL NFR BLD AUTO: 1.7 %
GLUCOSE SERPL-MCNC: 64 MG/DL
HCT VFR BLD CALC: 42.2 %
HDLC SERPL-MCNC: 68 MG/DL
HGB BLD-MCNC: 13.8 G/DL
IMM GRANULOCYTES NFR BLD AUTO: 0.2 %
LDLC SERPL CALC-MCNC: 93 MG/DL
LYMPHOCYTES # BLD AUTO: 2.39 K/UL
LYMPHOCYTES NFR BLD AUTO: 51.6 %
MAN DIFF?: NORMAL
MCHC RBC-ENTMCNC: 29.6 PG
MCHC RBC-ENTMCNC: 32.7 GM/DL
MCV RBC AUTO: 90.4 FL
MONOCYTES # BLD AUTO: 0.4 K/UL
MONOCYTES NFR BLD AUTO: 8.6 %
NEUTROPHILS # BLD AUTO: 1.7 K/UL
NEUTROPHILS NFR BLD AUTO: 36.8 %
NONHDLC SERPL-MCNC: 104 MG/DL
PLATELET # BLD AUTO: 239 K/UL
POTASSIUM SERPL-SCNC: 4.4 MMOL/L
PROT SERPL-MCNC: 7.1 G/DL
RBC # BLD: 4.67 M/UL
RBC # FLD: 14.5 %
SODIUM SERPL-SCNC: 139 MMOL/L
TRIGL SERPL-MCNC: 51 MG/DL
TSH SERPL-ACNC: 0.61 UIU/ML
WBC # FLD AUTO: 4.63 K/UL

## 2021-11-15 PROCEDURE — 90715 TDAP VACCINE 7 YRS/> IM: CPT

## 2021-11-15 PROCEDURE — 90471 IMMUNIZATION ADMIN: CPT

## 2021-11-15 PROCEDURE — 36415 COLL VENOUS BLD VENIPUNCTURE: CPT

## 2021-11-15 PROCEDURE — 99395 PREV VISIT EST AGE 18-39: CPT | Mod: 25

## 2021-11-15 RX ORDER — NAPROXEN 500 MG/1
500 TABLET ORAL
Refills: 0 | Status: DISCONTINUED | COMMUNITY
Start: 2020-11-24 | End: 2021-11-15

## 2021-11-16 LAB — HBV SURFACE AG SER QL: NONREACTIVE

## 2021-11-17 LAB
M TB IFN-G BLD-IMP: NEGATIVE
QUANTIFERON TB PLUS MITOGEN MINUS NIL: 8.22 IU/ML
QUANTIFERON TB PLUS NIL: 0.01 IU/ML
QUANTIFERON TB PLUS TB1 MINUS NIL: 0.08 IU/ML
QUANTIFERON TB PLUS TB2 MINUS NIL: 0.06 IU/ML

## 2021-11-19 NOTE — HEALTH RISK ASSESSMENT
[Excellent] : ~his/her~  mood as  excellent [Yes] : Yes [No falls in past year] : Patient reported no falls in the past year [0] : 2) Feeling down, depressed, or hopeless: Not at all (0) [With Family] : lives with family [Employed] : employed [Student] : student [Fully functional (bathing, dressing, toileting, transferring, walking, feeding)] : Fully functional (bathing, dressing, toileting, transferring, walking, feeding) [Fully functional (using the telephone, shopping, preparing meals, housekeeping, doing laundry, using] : Fully functional and needs no help or supervision to perform IADLs (using the telephone, shopping, preparing meals, housekeeping, doing laundry, using transportation, managing medications and managing finances) [] : No [[id number]] : 0 [de-identified] : mom, dad, sister [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

## 2021-11-19 NOTE — HISTORY OF PRESENT ILLNESS
[de-identified] : 26 year old female here to establish care and for a full physical examination. The patients complete medical, family and social history was documented and reviewed with the patient.  The patients medications were identified and also reviewed with the patient as well as any allergies to any medications. All active and previous medical problems were identified and discussed with the patient.  Any new problems were documented. Patient is feeling well today.\par

## 2021-11-19 NOTE — ASSESSMENT
[FreeTextEntry1] : Patient was counseled on healthy eating habits, on daily exercise and stress relief. All medications and allergies were reviewed with the patient and any adjustments necessary were made. Patient was counseled to try engage in an exercise activity for at least 30 minutes 3-4 times a week.  Patient was advised to eat a diet low in fat and carbohydrates and high in protein, with plenty of vegetables. Patient was advised to try and engage in relaxing activities whenever possible.\par The patients blood was draw in office and will be followed and assessed for any issues.  Patient was told to return to the office if any issues arise.  Unless otherwise stated, the patient is to continue all other medications as previously prescribed.\par \par needs forms for school\par quant ordered\par hep b antigen ordered\par \par vit d \par recheck\par \par tdap given\par Patient was given a vaccine and was counseled regarding all side affects. Patient was advised to ice the area if necessary if it is tender or red. Patient was told to return to office if has any fever, nausea, vomiting or increased pain.\par \par \par

## 2022-09-07 ENCOUNTER — RESULT REVIEW (OUTPATIENT)
Age: 27
End: 2022-09-07

## 2022-11-30 NOTE — ED ADULT NURSE NOTE - RESPIRATORY ASSESSMENT
[FreeTextEntry1] : Mr. Tao is a 74-year-old man with history of paroxysmal AF, syncope s/p ILR implant 10/2020, PFO, HTN, HLD, and MAY presenting for follow up.\par \par Patient previously followed with Dr. Healy and was last seen in office 8/2022.\par He last saw Dr. Lozano for ILR interrogation 6/2022, at that time there were no events recorded (remote interrogation 11/9/22 showed no events).\par \par Today, feels well. Denies active cardiopulmonary complaints.\par \par NM Stress 8/2022\par - Lexiscan study negative for ischemia\par \par TTE 12/2021\par - Normal biventricular function, mild LVH, mild LA dilatation, mod MR, mild Ao sclerosis\par \par Labs:\par - , HDL 45, LDL 48, non-HDL 60, TG 62\par - Cr 0.9, K 4.0 - - -

## 2022-12-12 ENCOUNTER — NON-APPOINTMENT (OUTPATIENT)
Age: 27
End: 2022-12-12

## 2024-02-08 ENCOUNTER — EMERGENCY (EMERGENCY)
Facility: HOSPITAL | Age: 29
LOS: 1 days | Discharge: HOME | End: 2024-02-08
Admitting: EMERGENCY MEDICINE
Payer: COMMERCIAL

## 2024-02-08 VITALS
DIASTOLIC BLOOD PRESSURE: 103 MMHG | TEMPERATURE: 98 F | RESPIRATION RATE: 20 BRPM | HEART RATE: 98 BPM | OXYGEN SATURATION: 99 % | SYSTOLIC BLOOD PRESSURE: 143 MMHG

## 2024-02-08 PROCEDURE — 99284 EMERGENCY DEPT VISIT MOD MDM: CPT

## 2024-02-08 RX ORDER — ALBUTEROL 90 UG/1
2 AEROSOL, METERED ORAL ONCE
Refills: 0 | Status: COMPLETED | OUTPATIENT
Start: 2024-02-08 | End: 2024-02-08

## 2024-02-08 RX ORDER — LORATADINE 10 MG/1
10 TABLET ORAL DAILY
Refills: 0 | Status: DISCONTINUED | OUTPATIENT
Start: 2024-02-08 | End: 2024-02-11

## 2024-02-08 RX ADMIN — ALBUTEROL 2 PUFF(S): 90 AEROSOL, METERED ORAL at 06:18

## 2024-02-08 RX ADMIN — LORATADINE 10 MILLIGRAM(S): 10 TABLET ORAL at 06:18

## 2024-02-08 RX ADMIN — Medication 200 MILLIGRAM(S): at 06:18

## 2024-02-08 NOTE — ED PROVIDER NOTE - PHYSICAL EXAMINATION
CONSTITUTIONAL: Well-appearing; well-nourished; in no apparent distress. Non-toxic appearing.   NEURO: Alert & oriented. Gait steady without assistance. Sensory and motor functions are grossly intact.  PSYCH: Mood appropriate. Thought processes intact.   ENT: Left ear impacted. Right EAC with mild cerumen but without edema, erythema, or tenderness on speculum exam. Right TM is pearly, no erythema or bulging. (+) clear fluid noted behind TM. Nose midline, no rhinorrhea. Posterior pharynx is pink. No tonsil edema or exudates. Uvula midline.   NECK: Supple. No adenopathy.   CARD: Regular rate and rhythm, no murmurs  RESP: No accessory muscle use; breath sounds clear and equal bilaterally; no wheezes, rhonchi, or rales     ABD: Soft; non-distended; non-tender. No guarding or rebound.   MUSCULOSKELETAL/EXTREMITIES: FROM in all four extremities; no extremity edema.  SKIN: Warm; dry; no apparent lesions or exudate

## 2024-02-08 NOTE — ED PROVIDER NOTE - OBJECTIVE STATEMENT
29 y/o female with no PMHx presents c/o dry cough. She notes x 1 week she's had nasal congestion and sore throat. No fever, chills, or body aches. She notes her sore throat is improving but now has dry cough since about 2 am this morning. Non-productive. No hemoptysis. Pt notes taking Mucinex DM with no relief. No CP, SOB. abd pain, NVD, rash, or leg swelling. No other voiced complaints.

## 2024-02-08 NOTE — ED PROVIDER NOTE - CLINICAL SUMMARY MEDICAL DECISION MAKING FREE TEXT BOX
27 y/o female with no PMHx presents c/o dry cough. She notes x 1 week she's had nasal congestion and sore throat. No fever, chills, or body aches. She notes her sore throat is improving but now has dry cough since about 2 am this morning. Non-productive. No hemoptysis. Pt notes taking Mucinex DM around 3AM with no relief. No CP, SOB. abd pain, NVD, rash, or leg swelling. No other voiced complaints.   Vitals- hypertensive otherwise WNL. Exam as noted above. DDX to consider but not limited to: pharyngitis, URI, bronchitis, PNA. Shared decision making - pt decided against CXR given no fever and acute onset. Pt counseled regarding signs/symptoms warranting return, pt verbalized understanding, all questions were answered.

## 2024-02-08 NOTE — ED PROVIDER NOTE - PATIENT PORTAL LINK FT
You can access the FollowMyHealth Patient Portal offered by [facility] by registering at the following website: [email or web address]. By joining [employer]’s FollowMyHealth portal, you will also be able to view your health information using other applications (apps) compatible with our system.

## 2024-02-08 NOTE — ED ADULT NURSE NOTE - OBJECTIVE STATEMENT
Pt received in intake, aaox4, ambulatory, breathing even and unlabored in bed. Pt reports having a worsening cough/runny nose for the last few days. Pt states recently she lost her voice due to coughing. Pt denies chest pain, SOB, dizziness, headache, blurry vision, chills. Bed in lowest position, call bell within reach. Pt medicated as per emar.

## 2024-02-08 NOTE — ED ADULT NURSE NOTE - DOES PATIENT HAVE ADVANCE DIRECTIVE
Patient received discharge instructions. Patient to discharge home with an uber. VSS. IV and tele removed. No

## 2024-02-08 NOTE — ED ADULT TRIAGE NOTE - CHIEF COMPLAINT QUOTE
Pt with no past medical history comes in c/o sore throat for a week and this morning around 2am she started coughing. Took Mucinex without relief. Afebrile.

## 2024-02-08 NOTE — ED PROVIDER NOTE - NSTIMEPROVIDERCAREINITIATE_GEN_ER
08-Feb-2024 05:37 [Post Hospitalization] : a post hospitalization visit [Formal Caregiver] : formal caregiver [FreeTextEntry1] : GI bleeding

## 2024-02-08 NOTE — ED ADULT NURSE NOTE - NSHOSCREENINGQ1_ED_ALL_ED
Diagnosis:   1. Malignant neoplasm of overlapping sites of left breast in female, estrogen receptor negative (CMS/HCC)      Regimen: AC post MD F/U  Cycle/Day: C7D1  Is this a C1D1 appt? No     Dr. Frank Cervantes is supervising clinician today.    ECOG:   ECOG [21 1102]   ECOG Performance Status 1       Review and verified Advanced Directives: No, patient has no interest in completing Advanced Directives at this time    Verified if patient has state DNR bracelet on: No; Full Code    Nursing Assessment:   A focused nursing assessment addressing the toxicity of chemotherapy was performed and the patient reports the following:  Nausea: NO  Vomiting: NO  Fever: NO  Chills: NO  Other signs of infection: NO  Bleeding: NO  Mucositis: NO  Diarrhea: NO  Constipation: NO  Anorexia: NO  Dysuria: NO  Urinary Bleeding: NO  Cough: NO  Shortness of Breath: NO  Fatigue/Weakness: NO  Numbness/Tingling: NO  Other Neuropathies: NO  Edema: NO  Rash: NO  Hand/Foot Syndrome: NO  Anxiety/Depression/Insomnia: NO  Pain: NO    Patient confirms that she has received a copy of Chemotherapy Consent and verbalizes understanding of treatment plan: Yes.     Pre-Treatment: Treatment consent signed  Patient has valid pre-authorization  VS completed  Height and weight verified  BSA independently double checked & verified by two practitioners  Premed orders, including hydration, are verified prior to administration  Treatment parameters verified in patient protocol  Lab results checked   Chemotherapy doses independently doubled checked & verified by two practitioners  I have reviewed the following with the patient:  Name of chemo drug, duration and route of infusion, and reportable infusion-related symptoms.    Treatment: Refer to [facility] and MAR for line assessment and medication administration  Chemotherapy has not ; double checked & verified by two practitioners  Appearance and physical integrity of drugs meets standard of drug monograph;  double checked & verified by two practitioners  Rate set on infusion pump is in alignment with ordered rate; double checked & verified by two practitioners  Blood return confirmed before, during and after treatment administered  Infusion pump used for non-vesicant drugs  Drugs were administered in proper sequencing    Post Treatment: Treatment tolerated well; no adverse reaction    Does the Patient have a central line?  yes:   Device: Port  Central Line Site: Right  and Chest  Central Line Site Appearance: clear and WDL  Is this a port? Yes: Implanted port accessed using a 20 gauge non-coring needle primed with 0.9% sodium chloride. Good blood return obtained.  Blood obtained and sent to lab.  Site Care: Aseptic site care per protocol, Sterile gauze and tape dressing applied and Injection caps changed/applied  Comments: Patient tolerated well  Central line flushed with: 10 ml 0.9 normal saline and 100 un/ml- 500 units heparin  Central line removed: no  Paulino Needle: Paulino needle de-accessed    Transfusion: Not needed    Integrative Medicine: No    Oral Chemotherapy: No     Education: Instructed on time to self-inject neulasta biosimilar tomorrow. instructed on decadron 4mg BID on D2 and D3. Patient verbalized understanding, denies questions/cocnerns.    Next appointment scheduled: 03/19/2021  Future Appointments   Date Time Provider Department Center   3/19/2021 11:45 AM [facility]   3/19/2021 12:00 PM Emelia Uribe MD [address]   Patient instructed to call the office with any questions or concerns.    Patient Discharged: Patient discharged to home per self, ambulatory, to home     No

## 2024-02-08 NOTE — ED ADULT NURSE NOTE - NSFALLUNIVINTERV_ED_ALL_ED
Bed/Stretcher in lowest position, wheels locked, appropriate side rails in place/Call bell, personal items and telephone in reach/Instruct patient to call for assistance before getting out of bed/chair/stretcher/Non-slip footwear applied when patient is off stretcher/[place] to call system/Physically safe environment - no spills, clutter or unnecessary equipment/Purposeful proactive rounding/Room/bathroom lighting operational, light cord in reach

## 2024-10-04 NOTE — ED PROVIDER NOTE - GENITOURINARY [+], MLM
Visit Time    Visit Start Time: 0830  Visit Stop Time: 0930  Total Visit Duration:  60 minutes    Subjective:     Patient ID: Cathryn Craig is a 38 y.o. female.    [facility] Clinical Progress Notes      Specialized Services Documentation  Therapist must complete separate progress note for each specific clinical activity in which the individual participated during the day.       EDUCATION THERAPY      Cathryn Craig actively shared in morning assessment and goal review. Presented as Receptive related to readiness to learn. Cathryn Craig  did complete goal from last treatment day identifying she gave her baby a bath,gaining responsibility. Cathryn Craig did not present with any barriers to learning. Throughout morning group, Cathryn Craig participated in mindfulness exercise and gratitude practice. Cathryn Craig made positive progress toward goal. Continue education group to assess willingness to engage, assess transfer of knowledge, and participate in skill development.    Tx Plan Objective: 1.1, 1.2, 1.3, 1.4 , Therapist: Balaji Christine    Visit Time    Visit Start Time: 1330  Visit Stop Time: 1430  Total Visit Duration:  60 minutes    Subjective:     Patient ID: Cathryn Craig is a 38 y.o. female.    [facility] Clinical Progress Notes      Specialized Services Documentation  Therapist must complete separate progress note for each specific clinical activity in which the individual participated during the day.       GROUP PSYCHOTHERAPY    Cathryn Craig participated actively in psychotherapy group.  This was observed Consistent throughout the treatment day. They were engaged in learning related to Wellness Tools. Staff utilized Verbal and Demonstration teaching methods.  Cathryn Craig shared area of learning and set a goal for outside of program to use grounding techniques.  Cathryn Craig was able to share items explored during the day and shared in adding to their WRAP.  Ended session with  staff led exercise on yoga breathing technique.  Cathryn Craig demonstrated positive progress toward goal.  Continue group psychotherapy to actively practice learned skills and encourage transfer of knowledge to unstructured time.    Tx Plan Objective: 1.1, 1.2, 1.3, 1.4 , Therapist: Balaji Christine    Allied Group Therapy      Visit Start Time: (1215)  Visit Stop Time: (1315)  Total Visit Duration:  60 minutes    Subjective:     Patient ID: Cathryn Craig 38 y.o.       This group was facilitated virtually in a private office using HIPAA Compliant and Approved Microsoft Teams.  Cathryn Craig actively engaged in psychoeducational group about positive self soothing activities. These activities help to self-soothe an individual and decrease decrease feelings of loneliness,anxiety, and boredom. Positive behavior change is focused toward a specified goal. Group explored the identifying factors that create loneliness,anxiety, and boredom. This process can help them to take care of emotional and intellectual moments of anxiety on a short term and long term basis. The group talked about understanding the meaning of self-soothing in regards to physical, intellectual, and emotional expression, regulation , and recognition, and how it affects themselves and others. Teaching on the emphasis of self monitoring and relapse, the group explored who they can go to for help was brought up as well. Group was encouraged to ask questions in an open forum at the end of group. Positive progress displayed through engagement in topic, Cathryn was a consistent contributor to the group activity.Cathryn Craig will continue to engage in psychotherapy to encourage positive self realization.  Treatment Plan Objective 1.1, 1.2, 1.3, 1.4 Therapist: Balaji KULKARNI Ed.    vaginal bleeding

## 2025-01-24 NOTE — ED ADULT TRIAGE NOTE - CHIEF COMPLAINT QUOTE
Pt co intermittent  left flank pain x 2 months . Pt states when pain comes on she becomes nauseated.   .pt states pain increases also with eating. Pt denies abdominal pain.
VITAL SIGNS: I have reviewed nursing notes and confirm.  CONSTITUTIONAL: well-appearing, non-toxic, NAD  SKIN: Warm dry, normal skin turgor, no acute rash, no bruising  HEAD: NCAT  EYES: EOMI, PERRLA, no scleral icterus, normal conjunctiva  ENT: Moist mucous membranes, OR clear. Normal pharynx  NECK: Supple; non tender. Full ROM. No cervical LAD  CARD: RRR, no murmurs, rubs or gallops  RESP: clear to ausculation b/l.  No rales, rhonchi, or wheezing. No increased WOB.  ABD: soft, + BS, non-tender, non-distended, no rebound or guarding. No CVA tenderness  EXT: Full ROM, no bony tenderness, pulses intact in bilateral UE and LE, no pedal edema, no calf tenderness  NEURO: normal motor. normal sensory. Normal gait.  PSYCH: Cooperative, appropriate.

## 2025-07-24 ENCOUNTER — EMERGENCY (EMERGENCY)
Facility: HOSPITAL | Age: 30
LOS: 1 days | End: 2025-07-24
Attending: EMERGENCY MEDICINE | Admitting: EMERGENCY MEDICINE
Payer: COMMERCIAL

## 2025-07-24 VITALS
SYSTOLIC BLOOD PRESSURE: 150 MMHG | RESPIRATION RATE: 20 BRPM | HEART RATE: 79 BPM | WEIGHT: 102.96 LBS | OXYGEN SATURATION: 99 % | DIASTOLIC BLOOD PRESSURE: 100 MMHG | TEMPERATURE: 98 F

## 2025-07-24 VITALS
TEMPERATURE: 98 F | RESPIRATION RATE: 17 BRPM | SYSTOLIC BLOOD PRESSURE: 111 MMHG | OXYGEN SATURATION: 100 % | DIASTOLIC BLOOD PRESSURE: 69 MMHG | HEART RATE: 61 BPM

## 2025-07-24 LAB
ALBUMIN SERPL ELPH-MCNC: 3.9 G/DL — SIGNIFICANT CHANGE UP (ref 3.3–5)
ALP SERPL-CCNC: 36 U/L — LOW (ref 40–120)
ALT FLD-CCNC: 10 U/L — SIGNIFICANT CHANGE UP (ref 4–33)
ANION GAP SERPL CALC-SCNC: 12 MMOL/L — SIGNIFICANT CHANGE UP (ref 7–14)
AST SERPL-CCNC: 20 U/L — SIGNIFICANT CHANGE UP (ref 4–32)
BASOPHILS # BLD AUTO: 0.04 K/UL — SIGNIFICANT CHANGE UP (ref 0–0.2)
BASOPHILS NFR BLD AUTO: 0.6 % — SIGNIFICANT CHANGE UP (ref 0–2)
BILIRUB SERPL-MCNC: <0.2 MG/DL — SIGNIFICANT CHANGE UP (ref 0.2–1.2)
BUN SERPL-MCNC: 12 MG/DL — SIGNIFICANT CHANGE UP (ref 7–23)
CALCIUM SERPL-MCNC: 9.3 MG/DL — SIGNIFICANT CHANGE UP (ref 8.4–10.5)
CHLORIDE SERPL-SCNC: 104 MMOL/L — SIGNIFICANT CHANGE UP (ref 98–107)
CO2 SERPL-SCNC: 22 MMOL/L — SIGNIFICANT CHANGE UP (ref 22–31)
CREAT SERPL-MCNC: 0.8 MG/DL — SIGNIFICANT CHANGE UP (ref 0.5–1.3)
EGFR: 104 ML/MIN/1.73M2 — SIGNIFICANT CHANGE UP
EGFR: 104 ML/MIN/1.73M2 — SIGNIFICANT CHANGE UP
EOSINOPHIL # BLD AUTO: 0.22 K/UL — SIGNIFICANT CHANGE UP (ref 0–0.5)
EOSINOPHIL NFR BLD AUTO: 3.4 % — SIGNIFICANT CHANGE UP (ref 0–6)
GLUCOSE SERPL-MCNC: 84 MG/DL — SIGNIFICANT CHANGE UP (ref 70–99)
HCG SERPL-ACNC: <1 MIU/ML — SIGNIFICANT CHANGE UP
HCT VFR BLD CALC: 36.6 % — SIGNIFICANT CHANGE UP (ref 34.5–45)
HGB BLD-MCNC: 12.4 G/DL — SIGNIFICANT CHANGE UP (ref 11.5–15.5)
IMM GRANULOCYTES # BLD AUTO: 0.02 K/UL — SIGNIFICANT CHANGE UP (ref 0–0.07)
IMM GRANULOCYTES NFR BLD AUTO: 0.3 % — SIGNIFICANT CHANGE UP (ref 0–0.9)
LYMPHOCYTES # BLD AUTO: 3.24 K/UL — SIGNIFICANT CHANGE UP (ref 1–3.3)
LYMPHOCYTES NFR BLD AUTO: 50.1 % — HIGH (ref 13–44)
MCHC RBC-ENTMCNC: 30.1 PG — SIGNIFICANT CHANGE UP (ref 27–34)
MCHC RBC-ENTMCNC: 33.9 G/DL — SIGNIFICANT CHANGE UP (ref 32–36)
MCV RBC AUTO: 88.8 FL — SIGNIFICANT CHANGE UP (ref 80–100)
MONOCYTES # BLD AUTO: 0.69 K/UL — SIGNIFICANT CHANGE UP (ref 0–0.9)
MONOCYTES NFR BLD AUTO: 10.7 % — SIGNIFICANT CHANGE UP (ref 2–14)
NEUTROPHILS # BLD AUTO: 2.26 K/UL — SIGNIFICANT CHANGE UP (ref 1.8–7.4)
NEUTROPHILS NFR BLD AUTO: 34.9 % — LOW (ref 43–77)
NRBC # BLD AUTO: 0 K/UL — SIGNIFICANT CHANGE UP (ref 0–0)
NRBC # FLD: 0 K/UL — SIGNIFICANT CHANGE UP (ref 0–0)
NRBC BLD AUTO-RTO: 0 /100 WBCS — SIGNIFICANT CHANGE UP (ref 0–0)
PLATELET # BLD AUTO: 190 K/UL — SIGNIFICANT CHANGE UP (ref 150–400)
PMV BLD: 12.2 FL — SIGNIFICANT CHANGE UP (ref 7–13)
POTASSIUM SERPL-MCNC: 3.9 MMOL/L — SIGNIFICANT CHANGE UP (ref 3.5–5.3)
POTASSIUM SERPL-SCNC: 3.9 MMOL/L — SIGNIFICANT CHANGE UP (ref 3.5–5.3)
PROT SERPL-MCNC: 6.9 G/DL — SIGNIFICANT CHANGE UP (ref 6–8.3)
RBC # BLD: 4.12 M/UL — SIGNIFICANT CHANGE UP (ref 3.8–5.2)
RBC # FLD: 14.3 % — SIGNIFICANT CHANGE UP (ref 10.3–14.5)
SODIUM SERPL-SCNC: 138 MMOL/L — SIGNIFICANT CHANGE UP (ref 135–145)
WBC # BLD: 6.47 K/UL — SIGNIFICANT CHANGE UP (ref 3.8–10.5)
WBC # FLD AUTO: 6.47 K/UL — SIGNIFICANT CHANGE UP (ref 3.8–10.5)

## 2025-07-24 PROCEDURE — 99284 EMERGENCY DEPT VISIT MOD MDM: CPT

## 2025-07-24 PROCEDURE — 70450 CT HEAD/BRAIN W/O DYE: CPT | Mod: 26

## 2025-07-24 RX ORDER — ACETAMINOPHEN 500 MG/5ML
700 LIQUID (ML) ORAL ONCE
Refills: 0 | Status: COMPLETED | OUTPATIENT
Start: 2025-07-24 | End: 2025-07-24

## 2025-07-24 RX ORDER — METOCLOPRAMIDE HCL 10 MG
10 TABLET ORAL ONCE
Refills: 0 | Status: COMPLETED | OUTPATIENT
Start: 2025-07-24 | End: 2025-07-24

## 2025-07-24 RX ORDER — KETOROLAC TROMETHAMINE 30 MG/ML
15 INJECTION, SOLUTION INTRAMUSCULAR; INTRAVENOUS ONCE
Refills: 0 | Status: DISCONTINUED | OUTPATIENT
Start: 2025-07-24 | End: 2025-07-24

## 2025-07-24 RX ADMIN — Medication 10 MILLIGRAM(S): at 13:39

## 2025-07-24 RX ADMIN — KETOROLAC TROMETHAMINE 15 MILLIGRAM(S): 30 INJECTION, SOLUTION INTRAMUSCULAR; INTRAVENOUS at 15:52

## 2025-07-24 RX ADMIN — Medication 280 MILLIGRAM(S): at 13:36

## 2025-07-24 NOTE — ED PROVIDER NOTE - PHYSICAL EXAMINATION
Attending/Lorna: Well-appearing, NAD; PERRL/EOMI, Fundoscopic-no papilledema, supple, no JO, no JVD, RRR, CTAB; Abd-soft, NT/ND, no HSM; no LE edema, A&Ox3, CN II-XII intact, nonfocal; Skin-warm/dry

## 2025-07-24 NOTE — ED ADULT TRIAGE NOTE - SPO2 (%)
Office Visit and Acne    Darlene Paige is a 57 year old female presenting for follow up of facial acne. Patient was last seen 2023. Patient reports she is taking Spirolactone 100 mg twice daily with no side effects. Patient also uses dapsone 5% gel and finacea 15% gel. Patient is happy with medications and is here for continued refills.   Blood Pressure: 116/68. Patients Potassium: 4.4 (10/11/2024)  Medication verified, no changes    ALLERGIES:   Allergen Reactions    Hydrocodone VOMITING    Penicillin G      infant     Current Outpatient Medications   Medication Sig Dispense Refill    norethindrone-ethinyl estradiol (Dasetta ) 1-35 MG-MCG per tablet Take 1 tablet by mouth daily. 84 tablet 3    Cholecalciferol (D3 ADULT PO) Take 1 tablet by mouth in the morning and 1 tablet in the evening.      azelaic acid (Finacea) 15 % gel Apply twice daily for rosacea 50 g 3    spironolactone (ALDACTONE) 100 MG tablet Take 1 tablet by mouth 2 times daily. 180 tablet 3    rosuvastatin (CRESTOR) 10 MG tablet Take 1 tablet by mouth daily. 90 tablet 3    dapsone 5 % gel Apply topically daily. 60 g 1    sumatriptan (Imitrex) 100 MG tablet Take 1 tablet by mouth daily as needed for Migraine. 9 tablet 5    ketoconazole (NIZORAL) 2 % cream Apply topically to affected area daily as directed. 30 g 0    aspirin-acetaminophen-caffeine (EXCEDRIN MIGRAINE) 250-250-65 MG per tablet Take 1 tablet by mouth every 6 hours as needed.      MULTI-VITAMIN PO TABS 1 TABLET DAILY 0 0     No current facility-administered medications for this visit.     Social History     Tobacco Use   Smoking Status Former    Current packs/day: 0.00    Average packs/day: 0.5 packs/day for 10.0 years (5.0 ttl pk-yrs)    Types: Cigarettes    Start date: 4/10/1983    Quit date: 4/10/1993    Years since quittin.6    Passive exposure: Past   Smokeless Tobacco Never             99

## 2025-07-24 NOTE — ED PROVIDER NOTE - NSFOLLOWUPINSTRUCTIONS_ED_ALL_ED_FT
Follow up with your Primary Medical Doctor in 1-2 days.  Follow up with neurology in 1-2 days see attached list.  Rest.  Stay well hydrated.  Return to the ER for any persistent/worsening or new symptoms weakness, dizziness, nausea, vomiting, or any concerning symptoms.

## 2025-07-24 NOTE — ED ADULT TRIAGE NOTE - CHIEF COMPLAINT QUOTE
Pt c/o migraine x 2weeks (was on ubrogepant 100mg but ran out). Pt denies dizziness, vision changes, no neuro deficits noted. Denies Phx

## 2025-07-24 NOTE — ED PROVIDER NOTE - PATIENT PORTAL LINK FT
You can access the FollowMyHealth Patient Portal offered by [facility] by registering at the following website: [email or web address]. By joining [employer]’s FollowMyHealth portal, you will also be able to view your health information using other applications (apps) compatible with our system.

## 2025-07-24 NOTE — ED PROVIDER NOTE - CLINICAL SUMMARY MEDICAL DECISION MAKING FREE TEXT BOX
29-year-old female with a past medical history of migraines presents to the emergency department c/o of 1 week of intermittent left-sided headache and nausea.  Patient states symptoms are consistent with previous migraines.  Patient states she has been taking over-the-counter medication and Ubrelvy given by her PMD with minimal relief.  Pt is well appearing, NAD, NV intact, likely migraine, will obtain labs, CT, pain control, reassess, follow up PMD and neurology. 29-year-old female with a past medical history of migraines presents to the emergency department c/o of 1 week of intermittent left-sided headache and nausea.  Patient states symptoms are consistent with previous migraines.  Patient states she has been taking over-the-counter medication and Ubrelvy given by her PMD with minimal relief.  Pt is well appearing, NAD, NV intact, likely migraine, will obtain labs, CT, pain control, reassess, follow up PMD and neurology.  Relevant EMR reviewed

## 2025-07-24 NOTE — ED PROVIDER NOTE - PROGRESS NOTE DETAILS
MANOLO Hart: pt feels better ambulating without difficulty.  Results reviewed with patient.  Discharge reviewed and discussed with patient.

## 2025-07-24 NOTE — ED ADULT NURSE NOTE - OBJECTIVE STATEMENT
Pt arrives to intake. Pt is A and Ox4 and ambulatory. Hx: GERD, migraine. Pt arrives to the ED complaining of a migraine x 2 weeks. Pt endorses the pain is heavy and constant. Pt denies blurry vision and no neuro deficits noted. Pt also endorsing intermittent n/v. Airway is patent, respirations are even and unlabored. Pt denies chest pain, shortness of breath, dizziness, numbness and tingling, fever and chills. Skin is clean, dry, intact, and appropriate for race.  20 G IV placed in the RAC. Labs sent per provider orders. Pt medicated per MAR. Plan of care ongoing, safety maintained.

## 2025-07-24 NOTE — ED PROVIDER NOTE - OBJECTIVE STATEMENT
29-year-old female with a past medical history of migraines presents to the emergency department c/o of 1 week of intermittent left-sided headache and nausea.  Patient states symptoms are consistent with previous migraines.  Patient states she has been taking over-the-counter medication and Ubrelvy given by her PMD with minimal relief.  Pt denies change in vision, recent illness, vomiting, weakness, dizziness, numbness, tingling, fevers, chills. 29-year-old female with a past medical history of migraines presents to the emergency department c/o of 1 week of intermittent left-sided headache and nausea.  Patient states symptoms are consistent with previous migraines.  Patient states she has been taking over-the-counter medication and Ubrelvy given by her PMD with minimal relief.  Pt denies change in vision, recent illness, vomiting, weakness, dizziness, numbness, tingling, fevers, chills.    Attending/Lorna: 29-year-old female with history as described above includes migraines diagnosed 2021 unclear of workup at that time who presents with her typical symptoms of left-sided headache associate with photophobia and nausea.  Patient reports for the past 2-week exacerbation of her symptoms intermittent, every other day, unknown relief with her breakthrough medication (Ubrgepant). She denies fever/chills, neck pain, weakness or lightheadedness. Patient has not been evaluated by a neurologist.